# Patient Record
Sex: MALE | Race: WHITE | NOT HISPANIC OR LATINO | Employment: OTHER | URBAN - METROPOLITAN AREA
[De-identification: names, ages, dates, MRNs, and addresses within clinical notes are randomized per-mention and may not be internally consistent; named-entity substitution may affect disease eponyms.]

---

## 2023-12-13 DIAGNOSIS — M25.552 LEFT HIP PAIN: Primary | ICD-10-CM

## 2023-12-15 ENCOUNTER — HOSPITAL ENCOUNTER (OUTPATIENT)
Dept: RADIOLOGY | Facility: HOSPITAL | Age: 73
Discharge: HOME | End: 2023-12-15
Attending: ORTHOPAEDIC SURGERY
Payer: MEDICARE

## 2023-12-15 ENCOUNTER — OFFICE VISIT (OUTPATIENT)
Dept: ORTHOPEDICS | Facility: CLINIC | Age: 73
End: 2023-12-15
Payer: MEDICARE

## 2023-12-15 VITALS — BODY MASS INDEX: 25.67 KG/M2 | HEIGHT: 74 IN | WEIGHT: 200 LBS

## 2023-12-15 DIAGNOSIS — M76.892 HAMSTRING TENDINITIS OF LEFT THIGH: ICD-10-CM

## 2023-12-15 DIAGNOSIS — M76.02 GLUTEAL TENDINITIS OF LEFT BUTTOCK: Primary | ICD-10-CM

## 2023-12-15 DIAGNOSIS — M25.552 LEFT HIP PAIN: ICD-10-CM

## 2023-12-15 PROCEDURE — 99999 PR PBB SHADOW E&M-EST. PATIENT-LVL III: ICD-10-PCS | Mod: PBBFAC,,, | Performed by: ORTHOPAEDIC SURGERY

## 2023-12-15 PROCEDURE — 99203 PR OFFICE/OUTPT VISIT, NEW, LEVL III, 30-44 MIN: ICD-10-PCS | Mod: S$PBB,,, | Performed by: ORTHOPAEDIC SURGERY

## 2023-12-15 PROCEDURE — 99999 PR PBB SHADOW E&M-EST. PATIENT-LVL III: CPT | Mod: PBBFAC,,, | Performed by: ORTHOPAEDIC SURGERY

## 2023-12-15 PROCEDURE — 99213 OFFICE O/P EST LOW 20 MIN: CPT | Mod: PBBFAC | Performed by: ORTHOPAEDIC SURGERY

## 2023-12-15 PROCEDURE — 73502 XR HIP WITH PELVIS WHEN PERFORMED, 2 OR 3 VIEWS LEFT: ICD-10-PCS | Mod: 26,LT,, | Performed by: RADIOLOGY

## 2023-12-15 PROCEDURE — 73502 X-RAY EXAM HIP UNI 2-3 VIEWS: CPT | Mod: TC,LT

## 2023-12-15 PROCEDURE — 73502 X-RAY EXAM HIP UNI 2-3 VIEWS: CPT | Mod: 26,LT,, | Performed by: RADIOLOGY

## 2023-12-15 PROCEDURE — 99203 OFFICE O/P NEW LOW 30 MIN: CPT | Mod: S$PBB,,, | Performed by: ORTHOPAEDIC SURGERY

## 2023-12-15 RX ORDER — AMLODIPINE BESYLATE 5 MG/1
5 TABLET ORAL
COMMUNITY
Start: 2023-07-10

## 2023-12-15 RX ORDER — ROSUVASTATIN CALCIUM 40 MG/1
TABLET, COATED ORAL
COMMUNITY
Start: 2023-06-26

## 2023-12-15 RX ORDER — LISINOPRIL 2.5 MG/1
5 TABLET ORAL
COMMUNITY
End: 2024-01-30

## 2023-12-15 RX ORDER — SITAGLIPTIN 100 MG/1
TABLET, FILM COATED ORAL
COMMUNITY
Start: 2023-06-26

## 2023-12-15 RX ORDER — METFORMIN HYDROCHLORIDE EXTENDED-RELEASE TABLETS 500 MG/1
500 TABLET, FILM COATED, EXTENDED RELEASE ORAL
COMMUNITY

## 2023-12-15 RX ORDER — MELOXICAM 15 MG/1
TABLET ORAL
COMMUNITY

## 2023-12-21 NOTE — PROGRESS NOTES
"Subjective:       Patient ID: Daniel Adame is a 73 y.o. male.    Chief Complaint:   No chief complaint on file.  He comes in for initial consultation and advice regarding pain in the left buttock area.  He calls it glute pain.  He is generally very healthy and active for his age of 73.  Rides his bicycle a lot with no problems, but when he works with a  and walk several miles he gets pain in the buttock and lateral hip.  Has tight hamstrings.  He does have pain where he sits.  No groin pain.  No knee pain.  No distal numbness or tingling.    No past medical history on file.  No past surgical history on file.  No family history on file.  Social History     Socioeconomic History    Marital status:        Current Outpatient Medications   Medication Sig Dispense Refill    amLODIPine (NORVASC) 5 MG tablet Take 5 mg by mouth.      JANUVIA 100 mg Tab       rosuvastatin (CRESTOR) 40 MG Tab       lisinopriL (PRINIVIL,ZESTRIL) 2.5 MG tablet Take 5 mg by mouth.      meloxicam (MOBIC) 15 MG tablet       metFORMIN (FORTAMET) 500 mg 24hr tablet Take 500 mg by mouth.       No current facility-administered medications for this visit.     Review of patient's allergies indicates:   Allergen Reactions    Shellfish containing products Anaphylaxis    Shellfish derived Anaphylaxis    Iodinated contrast media Hives    Iodine          Objective:      Vitals:    12/15/23 1427   Weight: 90.7 kg (200 lb)   Height: 6' 2" (1.88 m)     Physical Exam  Negative Stinchfield, negative C sign.  Tender at the ischial tuberosity and greater trochanter.  Buttock pain with hamstring stretching.  Negative flip test.  Knee benign without tenderness or effusion with normal range of motion.  Distal neurovascular intact.  Imaging Review:   X-rays show no significant arthrosis, acute findings or suspicious bone defects  Assessment:   Tendinosis at the left hamstrings origin and at the greater trochanter  Plan:       He is referred to " physical therapy, and Dr. Ranjit Horan for further evaluation, interventions as deemed appropriate.  No arthroplasty indication.  The patient's pathophysiology was explained in detail with reference to x-rays, models, other visual aids as appropriate.  Treatment options were discussed in detail.  Questions were invited and answered to the patient's satisfaction.    Víctor De La Cruz MD  Orthopaedic Surgery

## 2024-01-03 NOTE — PROGRESS NOTES
CC: left hip pain    73 y.o. Male presents today for evaluation of his left hip pain. He was referred by Dr. De La Cruz for consideration of non-surgical treatment options. This pain is located proximally in the left buttock near the ischial tuberosity and over the lateral hip. He points with a few fingers over the posterior glute musculature. He is not interested in injection options at this time.   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 No pertinent past medical history.    PAST SURGICAL HISTORY:   History reviewed. No pertinent surgical history.    FAMILY HISTORY:   History reviewed. No pertinent family history.    SOCIAL HISTORY:   Social History     Socioeconomic History    Marital status:      Social Determinants of Health     Financial Resource Strain: Low Risk  (2024)    Overall Financial Resource Strain (CARDIA)     Difficulty of Paying Living Expenses: Not hard at all   Food Insecurity: No Food Insecurity (2024)    Hunger Vital Sign     Worried About Running Out of Food in the Last Year: Never true     Ran Out of Food in the Last Year: Never true   Transportation Needs: No Transportation Needs (2024)    PRAPARE - Transportation     Lack of Transportation (Medical): No     Lack of Transportation (Non-Medical): No   Physical Activity: Sufficiently Active (2024)    Exercise Vital Sign     Days of Exercise per Week: 4 days     Minutes of  "Exercise per Session: 60 min   Stress: No Stress Concern Present (1/2/2024)    [employer] - Occupational Stress Questionnaire     Feeling of Stress : Only a little   Social Connections: Unknown (1/2/2024)    Social Connection and Isolation Panel [NHANES]     Frequency of Communication with Friends and Family: Three times a week     Frequency of Social Gatherings with Friends and Family: More than three times a week     Active Member of Clubs or Organizations: Yes     Attends Club or Organization Meetings: More than 4 times per year     Marital Status:    Housing Stability: High Risk (1/2/2024)    Housing Stability Vital Sign     Unable to Pay for Housing in the Last Year: Yes     Unstable Housing in the Last Year: No       MEDICATIONS:     Current Outpatient Medications:     amLODIPine (NORVASC) 5 MG tablet, Take 5 mg by mouth., Disp: , Rfl:     JANUVIA 100 mg Tab, , Disp: , Rfl:     lisinopriL (PRINIVIL,ZESTRIL) 2.5 MG tablet, Take 5 mg by mouth., Disp: , Rfl:     meloxicam (MOBIC) 15 MG tablet, , Disp: , Rfl:     metFORMIN (FORTAMET) 500 mg 24hr tablet, Take 500 mg by mouth., Disp: , Rfl:     rosuvastatin (CRESTOR) 40 MG Tab, , Disp: , Rfl:     ALLERGIES:   Review of patient's allergies indicates:   Allergen Reactions    Shellfish containing products Anaphylaxis    Shellfish derived Anaphylaxis    Iodinated contrast media Hives    Iodine         PHYSICAL EXAMINATION:  BP (!) 149/76   Pulse 70   Ht 6' 2" (1.88 m)   Wt 93.7 kg (206 lb 9.1 oz)   BMI 26.52 kg/m²   Vitals signs and nursing note have been reviewed.  General: In no acute distress, well developed, well nourished, no diaphoresis  Eyes: EOM full and smooth, no eye redness or discharge  HENT: normocephalic and atraumatic, neck supple, trachea midline, no nasal discharge, no external ear redness or discharge  Cardiovascular: no LE edema  Lungs: respirations non-labored, no conversational dyspnea   Abd: non-distended, " no rigidity  MSK: no amputation or deformity, no swelling of extremities  Neuro: AAOx3, CN2-12 grossly intact  Skin: No rashes, warm and dry  Psychiatric: cooperative, pleasant, mood and affect appropriate for age    HIP: LEFT  The affected hip is compared to the contralateral hip.    Observation:    There is no edema, erythema, or ecchymosis in the lumbosacral region.   No obvious pelvic obliquity while standing.    No thoracolumbar scoliosis observed.    No midline skin abnormalities.  No atrophy noted in the lower limbs.    ROM:   Passive hip flexion to 120° on left and 120° on right.    Passive hip internal rotation to 45° on left and 45° on right.    Passive hip external rotation to 45° on left and 45° on right.    Passive hip abduction to 45° on left and 45° on right.    All motions above are without pain.    Tenderness To Palpation:  No tenderness at the ASIS, AIIS, PSIS, PIIS, iliac crest, pubic bones  No tenderness throughout the lumbar spine, iliolumbar region  + tenderness over the posterior glute adjacent to the SIJ and lower near ischial tuberosity  No tenderness over the greater trochanteric bursa or greater/lesser trochanters.  No tenderness at the glut attachments on the greater trochanter  No tenderness over proximal IT band or hip flexor musculature.    Strength Testing: (*pain)  Hip flexion - 5/5 on left and 5/5 on right  Hip extension - 5/5 on left and 5/5 on right  Hip adduction - 5/5 on left and 5/5 on right  Hip abduction - 5/5 on left and 5/5 on right  Knee flexion - 5/5 on left and 5/5 on right  Knee extension - 5/5 on left* and 5/5 on right    Special Tests:  Seated straight leg raise - negative  Supine straight leg raise - negative  Hamstring flexibility symmetric  Quadriceps flexibility symmetric    Log roll - negative  LIZA - negative  FADIR - negative  Scour test - negative  No pain with posterior hip capsule compression    Thigh thrust test - negative     Kush test negative.    Ronak  test - negative      Posture:  Upright and Anterior pelvic tilt with increased lumbar lordosis  Gait: Non-antalgic     TART (Tissue texture abnormality, Asymmetry,  Restriction of motion and/or Tenderness) changes:    Head:      Cervical Spine  Thoracic Spine  Lumbar Spine   C1  T1  L1 TTAR   C2  T2  L2 Neutral   C3  T3  L3 Neutral   C4  T4  L4 FRSR   C5  T5  L5 FRSR   C6  T6 Neutral     C7  T7 Neutral       T8 Neutral       T9 Neutral       T10 TTAR       T11 TTAR       T12 TTAR       Ribs:    Upper Extremity:    Abdomen:    Pelvis:  Innominate:Left anterior rotation  Pubic bone:Left interior pubic shear      Sacrum:Right on Left sacral torsion    Lower Extremity:  External tibial torsion on the left      Key   F= Flexed   E = Extended   R = Rotated   N = Neutral   S = Sidebent   TTA = tissue texture abnormality   L/R/B (last letter) = left/right/bilateral   HTP = fascial herniated trigger point   TB = fascial trigger band   CD = fascial continuum distortion       Neurovascular Exam:  Normal gait without Trendelenburg.  1+/4 reflexes at L4 and S1 dermatomes but symmetric  2+ femoral, DP, and PT pulses BL.  No skin changes, no abnormal hair distribution.  Sensation intact to light touch throughout the obturator and medial/lateral/posterior femoral cutaneous nerves.  Capillary refill intact to <2 seconds in all lower limb digits.    IMAGIN. X-ray obtained 12/15/2023 due to left hip pain   2. X-ray images were reviewed personally by me and then directly with patient.  3. FINDINGS: X-ray images obtained demonstrate no fracture or dislocation.  There is my mild-to-moderate narrowing of the bilateral hip cartilage spaces.  Advanced degenerative changes are seen in the lower lumbar spine. No suspicious appearing lytic or blastic lesions.  Vascular calcifications are present.   4. IMPRESSION: As above.       ASSESSMENT:      ICD-10-CM ICD-9-CM   1. Posterior pain of left hip  M25.552 719.45   2. Gluteal tendinitis of  left buttock  M76.02 726.5   3. Hamstring tendinitis of left thigh  M76.892 727.09   4. Somatic dysfunction of lumbar region  M99.03 739.3   5. Somatic dysfunction of pelvis region  M99.05 739.5   6. Somatic dysfunction of sacral region  M99.04 739.4   7. Somatic dysfunction of thoracic region  M99.02 739.2   8. Somatic dysfunction of lower extremity  M99.06 739.6       PLAN:  1-3. 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 This pain began without trauma and has been mildly progressive over the past few months. Daniel is interested in non-injection treatment options at this time as he is concerned about possible blood glucose fluctuations with steroid medication.    - XRs obtained 12/15/2023 and images were personally reviewed with the patient. See above for further detail.    - Symptoms, exam, and imaging are most consistent with left hip myofascial pain secondary to biomechanical restriction patterns through the lumbosacral spine and pelvis.  We discussed the importance of decreasing inflammation and strengthening and stabilizing to help promote and maintain symptom improvement/resolution.  This is commonly accomplished with a short course of an anti-inflammatory and icing in addition to osteopathic manipulation, a home exercise program or physical therapy.    - Based on his description/body language of pain and somatic dysfunction identified on exam, I discussed osteopathic manipulation as a treatment option today.  He consents to evaluation and treatment.  See below.    - HEP for the hips prescribed today. Handouts provided, explained, and exercises were demonstrated as needed. Encouraged to do daily. 17534 HOME EXERCISE PROGRAM (HEP):  The patient was taught a homegoing physical therapy regimen as described above by provider with assistance of sports medicine assistant. The patient demonstrated understanding of  the exercises and proper technique of their execution. This interaction took 15 minutes.       4-8.Somatic dysfunction of lumbar, pelvis, sacral, thoracic, lower extremity regions -     - OMT 5-6 regions. Oral consent obtained. Reviewed benefits and potential side effects. OMT indicated today due to signs and symptoms as well as local and remote somatic dysfunction findings and their related neurokinetic, lymphatic, fascial and/or arteriovenous body connections. OMT techniques used: Soft Tissue, Myofascial Release, Muscle Energy, and Fascial Distortion Model. Treatment was tolerated well. Improvement noted in segmental mobility post-treatment in dysfunctional regions. There were no adverse events and no complications immediately following treatment. Advised plenty of water to help alleviate soreness.       Future planning includes - additional OMT if helpful and if indicated. He would like to defer all injection options for treatment at this time.    All questions were answered to the best of my ability and all concerns were addressed at this time.    Follow up in 2-3 weeks for above, or sooner if needed.      This note is dictated using the M*Modal Fluency Direct word recognition program. There are word recognition mistakes that are occasionally missed on review.      Total time spent face-to face with patient counseling or coordinating care including prognosis, differential diagnosis, risks and benefits of treatment, instructions, compliance risk reductions as well as non-face-to-face time personally spent reviewing medial record, medical documentation, and coordination of care.     EST MINUTES X   [id number] 10-19    [id number] 20-29    [id number] 30-39 X   [id number] 40-54    NEW     91027 15-29    19554 30-44    [id number] 45-59    [id number] 60-74    PHONE      5-10    [id number] 11-20    [id number] 21-30

## 2024-01-09 ENCOUNTER — OFFICE VISIT (OUTPATIENT)
Dept: SPORTS MEDICINE | Facility: CLINIC | Age: 74
End: 2024-01-09
Payer: MEDICARE

## 2024-01-09 VITALS
HEIGHT: 74 IN | BODY MASS INDEX: 26.51 KG/M2 | DIASTOLIC BLOOD PRESSURE: 76 MMHG | HEART RATE: 70 BPM | WEIGHT: 206.56 LBS | SYSTOLIC BLOOD PRESSURE: 149 MMHG

## 2024-01-09 DIAGNOSIS — M99.05 SOMATIC DYSFUNCTION OF PELVIS REGION: ICD-10-CM

## 2024-01-09 DIAGNOSIS — M25.552 POSTERIOR PAIN OF LEFT HIP: Primary | ICD-10-CM

## 2024-01-09 DIAGNOSIS — M76.892 HAMSTRING TENDINITIS OF LEFT THIGH: ICD-10-CM

## 2024-01-09 DIAGNOSIS — M99.03 SOMATIC DYSFUNCTION OF LUMBAR REGION: ICD-10-CM

## 2024-01-09 DIAGNOSIS — M76.02 GLUTEAL TENDINITIS OF LEFT BUTTOCK: ICD-10-CM

## 2024-01-09 DIAGNOSIS — M99.04 SOMATIC DYSFUNCTION OF SACRAL REGION: ICD-10-CM

## 2024-01-09 DIAGNOSIS — M99.02 SOMATIC DYSFUNCTION OF THORACIC REGION: ICD-10-CM

## 2024-01-09 DIAGNOSIS — M99.06 SOMATIC DYSFUNCTION OF LOWER EXTREMITY: ICD-10-CM

## 2024-01-09 PROCEDURE — 97110 THERAPEUTIC EXERCISES: CPT | Mod: GP,,, | Performed by: ORTHOPAEDIC SURGERY

## 2024-01-09 PROCEDURE — 98927 OSTEOPATH MANJ 5-6 REGIONS: CPT | Mod: S$PBB,,, | Performed by: ORTHOPAEDIC SURGERY

## 2024-01-09 PROCEDURE — 98927 OSTEOPATH MANJ 5-6 REGIONS: CPT | Mod: PBBFAC,27 | Performed by: ORTHOPAEDIC SURGERY

## 2024-01-09 PROCEDURE — 99213 OFFICE O/P EST LOW 20 MIN: CPT | Mod: 25,PBBFAC | Performed by: ORTHOPAEDIC SURGERY

## 2024-01-09 PROCEDURE — 99204 OFFICE O/P NEW MOD 45 MIN: CPT | Mod: 25,S$PBB,, | Performed by: ORTHOPAEDIC SURGERY

## 2024-01-09 PROCEDURE — 99999 PR PBB SHADOW E&M-EST. PATIENT-LVL III: CPT | Mod: PBBFAC,,, | Performed by: ORTHOPAEDIC SURGERY

## 2024-01-30 ENCOUNTER — OFFICE VISIT (OUTPATIENT)
Dept: SPORTS MEDICINE | Facility: CLINIC | Age: 74
End: 2024-01-30
Payer: MEDICARE

## 2024-01-30 VITALS
BODY MASS INDEX: 26.31 KG/M2 | HEIGHT: 74 IN | WEIGHT: 205 LBS | HEART RATE: 67 BPM | DIASTOLIC BLOOD PRESSURE: 65 MMHG | SYSTOLIC BLOOD PRESSURE: 110 MMHG

## 2024-01-30 DIAGNOSIS — M99.04 SOMATIC DYSFUNCTION OF SACRAL REGION: ICD-10-CM

## 2024-01-30 DIAGNOSIS — M99.02 SOMATIC DYSFUNCTION OF THORACIC REGION: ICD-10-CM

## 2024-01-30 DIAGNOSIS — M99.05 SOMATIC DYSFUNCTION OF PELVIS REGION: ICD-10-CM

## 2024-01-30 DIAGNOSIS — M99.03 SOMATIC DYSFUNCTION OF LUMBAR REGION: ICD-10-CM

## 2024-01-30 DIAGNOSIS — M99.06 SOMATIC DYSFUNCTION OF LOWER EXTREMITY: ICD-10-CM

## 2024-01-30 DIAGNOSIS — M76.02 GLUTEAL TENDINITIS OF LEFT BUTTOCK: ICD-10-CM

## 2024-01-30 DIAGNOSIS — M25.552 POSTERIOR PAIN OF LEFT HIP: Primary | ICD-10-CM

## 2024-01-30 DIAGNOSIS — M76.892 HAMSTRING TENDINITIS OF LEFT THIGH: ICD-10-CM

## 2024-01-30 PROCEDURE — 98927 OSTEOPATH MANJ 5-6 REGIONS: CPT | Mod: S$PBB,,, | Performed by: ORTHOPAEDIC SURGERY

## 2024-01-30 PROCEDURE — 99214 OFFICE O/P EST MOD 30 MIN: CPT | Mod: 25,S$PBB,, | Performed by: ORTHOPAEDIC SURGERY

## 2024-01-30 PROCEDURE — 99999 PR PBB SHADOW E&M-EST. PATIENT-LVL III: CPT | Mod: PBBFAC,,, | Performed by: ORTHOPAEDIC SURGERY

## 2024-01-30 PROCEDURE — 98927 OSTEOPATH MANJ 5-6 REGIONS: CPT | Mod: PBBFAC,27 | Performed by: ORTHOPAEDIC SURGERY

## 2024-01-30 PROCEDURE — 99213 OFFICE O/P EST LOW 20 MIN: CPT | Mod: 25,PBBFAC | Performed by: ORTHOPAEDIC SURGERY

## 2024-01-30 RX ORDER — LISINOPRIL 5 MG/1
5 TABLET ORAL 2 TIMES DAILY
COMMUNITY
Start: 2023-07-28

## 2024-01-30 NOTE — PROGRESS NOTES
CC: left hip pain    Daniel is here today for follow up evaluation of his left hip pain. Patient reports his pain is 3/10 today. Today, he admits to continued tenderness over the left glute, ischial tuberosity. Pain is worsened by hip extension, knee flexion, direct pressure with sitting. He points to the midpoint of the left glute max with 2 fingers. He has decreased physical activity in the interim, still exercises with his . He has changed his massimo, stride and gait pattern when walking, and admits to some benefit when doing this attributable to OMT. Is unable to provide specific percentage improvement. He has not been doing HEP provided at last visit.    Recall from visit on 2024  73 y.o. Male presents today for evaluation of his left hip pain. He was referred by Dr. De La Cruz for consideration of non-surgical treatment options. This pain is located proximally in the left buttock near the ischial tuberosity and over the lateral hip. He points with a few fingers over the posterior glute musculature. He is not interested in injection options at this time.   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 No pertinent past medical history.    PAST SURGICAL HISTORY:   History reviewed. No pertinent surgical history.    FAMILY HISTORY:   History reviewed. No pertinent family history.    SOCIAL HISTORY:   Social History     Socioeconomic History    Marital  status:      Social Determinants of Health     Financial Resource Strain: Low Risk  (1/2/2024)    Overall Financial Resource Strain (CARDIA)     Difficulty of Paying Living Expenses: Not hard at all   Food Insecurity: No Food Insecurity (1/2/2024)    Hunger Vital Sign     Worried About Running Out of Food in the Last Year: Never true     Ran Out of Food in the Last Year: Never true   Transportation Needs: No Transportation Needs (1/2/2024)    PRAPARE - Transportation     Lack of Transportation (Medical): No     Lack of Transportation (Non-Medical): No   Physical Activity: Sufficiently Active (1/2/2024)    Exercise Vital Sign     Days of Exercise per Week: 4 days     Minutes of Exercise per Session: 60 min   Stress: No Stress Concern Present (1/2/2024)    [employer] - Occupational Stress Questionnaire     Feeling of Stress : Only a little   Social Connections: Unknown (1/2/2024)    Social Connection and Isolation Panel [NHANES]     Frequency of Communication with Friends and Family: Three times a week     Frequency of Social Gatherings with Friends and Family: More than three times a week     Active Member of Clubs or Organizations: Yes     Attends Club or Organization Meetings: More than 4 times per year     Marital Status:    Housing Stability: High Risk (1/2/2024)    Housing Stability Vital Sign     Unable to Pay for Housing in the Last Year: Yes     Unstable Housing in the Last Year: No       MEDICATIONS:     Current Outpatient Medications:     amLODIPine (NORVASC) 5 MG tablet, Take 5 mg by mouth., Disp: , Rfl:     JANUVIA 100 mg Tab, , Disp: , Rfl:     lisinopriL (PRINIVIL,ZESTRIL) 5 MG tablet, Take 5 mg by mouth 2 (two) times a day., Disp: , Rfl:     meloxicam (MOBIC) 15 MG tablet, , Disp: , Rfl:     metFORMIN (FORTAMET) 500 mg 24hr tablet, Take 500 mg by mouth., Disp: , Rfl:     rosuvastatin (CRESTOR) 40 MG Tab, , Disp: , Rfl:     ALLERGIES:   Review of patient's  "allergies indicates:   Allergen Reactions    Shellfish containing products Anaphylaxis    Shellfish derived Anaphylaxis    Iodinated contrast media Hives    Iodine         PHYSICAL EXAMINATION:  /65   Pulse 67   Ht 6' 2" (1.88 m)   Wt 93 kg (205 lb 0.4 oz)   BMI 26.32 kg/m²   Vitals signs and nursing note have been reviewed.  General: In no acute distress, well developed, well nourished, no diaphoresis  Eyes: EOM full and smooth, no eye redness or discharge  HENT: normocephalic and atraumatic, neck supple, trachea midline, no nasal discharge, no external ear redness or discharge  Cardiovascular: no LE edema  Lungs: respirations non-labored, no conversational dyspnea   Abd: non-distended, no rigidity  MSK: no amputation or deformity, no swelling of extremities  Neuro: AAOx3, CN2-12 grossly intact  Skin: No rashes, warm and dry  Psychiatric: cooperative, pleasant, mood and affect appropriate for age    HIP: LEFT  The affected hip is compared to the contralateral hip.    Observation:    There is no edema, erythema, or ecchymosis in the lumbosacral region.   No obvious pelvic obliquity while standing.    No thoracolumbar scoliosis observed.    No midline skin abnormalities.  No atrophy noted in the lower limbs.    ROM:   Passive hip flexion to 120° on left and 120° on right.    Passive hip internal rotation to 45° on left and 45° on right.    Passive hip external rotation to 45° on left and 45° on right.    Passive hip abduction to 45° on left and 45° on right.    All motions above are without pain.    Tenderness To Palpation:  No tenderness at the ASIS, AIIS, PSIS, PIIS, iliac crest, pubic bones  No tenderness throughout the lumbar spine, iliolumbar region  + tenderness over the left posterior glute musculature and more mild lower near ischial tuberosity, focal point of maximal tenderness over medial glute  No tenderness over the greater trochanteric bursa or greater/lesser trochanters.  No tenderness at the " glut attachments on the greater trochanter  No tenderness over proximal IT band or hip flexor musculature.    Strength Testing: (*pain)  Hip flexion - 5/5 on left and 5/5 on right  Hip extension - 5/5 on left* and 5/5 on right  Hip adduction - 5/5 on left and 5/5 on right  Hip abduction - 5/5 on left and 5/5 on right  Knee flexion - 5/5 on left and 5/5 on right  Knee extension - 5/5 on left and 5/5 on right    Special Tests:  Seated straight leg raise - negative  Supine straight leg raise - negative  Hamstring flexibility tighter on left  Quadriceps flexibility tighter on left    Log roll - negative  LIZA - negative  FADIR - negative  Scour test - negative  No pain with posterior hip capsule compression      Posture:  Upright and Anterior pelvic tilt with increased lumbar lordosis  Gait: Non-antalgic     TART (Tissue texture abnormality, Asymmetry,  Restriction of motion and/or Tenderness) changes:    Head:      Cervical Spine  Thoracic Spine  Lumbar Spine   C1  T1 Neutral L1 TTAB   C2  T2 Neutral L2 Neutral   C3  T3 Neutral L3 FRSL   C4  T4 Neutral L4 FRSL   C5  T5 TTAB L5 FRSL   C6  T6 TTAB     C7  T7 TTAB       T8 TTAB       T9 TTAB       T10 TTAB       T11 TTAB       T12 TTAB       Ribs:    Upper Extremity:    Abdomen:    Pelvis:  Innominate:Left anterior rotation  Pubic bone:Left interior pubic shear  Fascial herniated trigger point at the inferomedial aspect of the left PSIS    Sacrum:Right on Left sacral torsion  Fascial herniated trigger point at the lateral aspect of the left sacral border    Lower Extremity:  External tibial torsion on the left  Myofascial restriction left posterior thigh, left posterior hip      Key   F= Flexed   E = Extended   R = Rotated   N = Neutral   S = Sidebent   TTA = tissue texture abnormality   L/R/B (last letter) = left/right/bilateral   HTP = fascial herniated trigger point   TB = fascial trigger band   CD = fascial continuum distortion       Neurovascular Exam:  Normal gait  without Trendelenburg.  1+/4 reflexes at L4 and S1 dermatomes but symmetric  2+ femoral, DP, and PT pulses BL.  No skin changes, no abnormal hair distribution.  Sensation intact to light touch throughout the obturator and medial/lateral/posterior femoral cutaneous nerves.  Capillary refill intact to <2 seconds in all lower limb digits.    IMAGIN. X-ray obtained 12/15/2023 due to left hip pain   2. X-ray images were reviewed personally by me and then directly with patient.  3. FINDINGS: X-ray images obtained demonstrate no fracture or dislocation.  There is my mild-to-moderate narrowing of the bilateral hip cartilage spaces.  Advanced degenerative changes are seen in the lower lumbar spine. No suspicious appearing lytic or blastic lesions.  Vascular calcifications are present.   4. IMPRESSION: As above.       ASSESSMENT:      ICD-10-CM ICD-9-CM   1. Posterior pain of left hip  M25.552 719.45   2. Gluteal tendinitis of left buttock  M76.02 726.5   3. Hamstring tendinitis of left thigh  M76.892 727.09   4. Somatic dysfunction of lumbar region  M99.03 739.3   5. Somatic dysfunction of pelvis region  M99.05 739.5   6. Somatic dysfunction of sacral region  M99.04 739.4   7. Somatic dysfunction of thoracic region  M99.02 739.2   8. Somatic dysfunction of lower extremity  M99.06 739.6       PLAN:  1-3. 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 This pain began without trauma and has been mildly progressive over the past few months. Daniel is interested in non-injection treatment options at this time as he is concerned about possible blood glucose fluctuations with steroid medication.    - His pain is overall stable today, with some mild improvement with walking that he attributes to the OMT provided at prior visit as well as work with his . His pain is more localized to the left gluteal musculature  based on his exam findings.    - Symptoms are still consistent with myofascial restriction and focusing on improving his body mechanics and muscle recruitment will hopefully help with his remaining symptoms.    - Based on his description/body language of pain and somatic dysfunction identified on exam, I discussed osteopathic manipulation as a treatment option today.  He consents to evaluation and treatment.  See below.    - Continue with HEP for the hips prescribed at initial visit. We also discussed that he can discuss with his  to focus on gluteal stability exercises to augment HEP.      4-8. Somatic dysfunction of lumbar, pelvis, sacral, thoracic, lower extremity regions -     - OMT 5-6 regions. Oral consent obtained. Reviewed benefits and potential side effects. OMT indicated today due to signs and symptoms as well as local and remote somatic dysfunction findings and their related neurokinetic, lymphatic, fascial and/or arteriovenous body connections. OMT techniques used: Soft Tissue, Myofascial Release, Muscle Energy, and Fascial Distortion Model. Treatment was tolerated well. Improvement noted in segmental mobility post-treatment in dysfunctional regions. There were no adverse events and no complications immediately following treatment. Advised plenty of water to help alleviate soreness.       Future planning includes - additional OMT if helpful and if indicated. Consider additional imaging if OMT is not beneficial. He would like to continue to defer all injection options for treatment at this time.    All questions were answered to the best of my ability and all concerns were addressed at this time.    Follow up in 2-3 weeks for above, or sooner if needed.      This note is dictated using the M*Modal Fluency Direct word recognition program. There are word recognition mistakes that are occasionally missed on review.      Total time spent face-to face with patient counseling or coordinating  care including prognosis, differential diagnosis, risks and benefits of treatment, instructions, compliance risk reductions as well as non-face-to-face time personally spent reviewing medial record, medical documentation, and coordination of care.     EST MINUTES X   02560 10-19    30517 20-29    [id number] 30-39 X   [id number] 40-54    NEW     [id number] 15-29    [id number] 30-44    [id number] 45-59    [id number] 60-74    PHONE      5-10    31230 11-20    [id number] 21-30

## 2024-02-23 ENCOUNTER — OFFICE VISIT (OUTPATIENT)
Dept: SPORTS MEDICINE | Facility: CLINIC | Age: 74
End: 2024-02-23
Payer: MEDICARE

## 2024-02-23 VITALS
SYSTOLIC BLOOD PRESSURE: 125 MMHG | BODY MASS INDEX: 26.32 KG/M2 | WEIGHT: 205 LBS | DIASTOLIC BLOOD PRESSURE: 64 MMHG | HEART RATE: 66 BPM

## 2024-02-23 DIAGNOSIS — M99.06 SOMATIC DYSFUNCTION OF LOWER EXTREMITY: ICD-10-CM

## 2024-02-23 DIAGNOSIS — M76.02 GLUTEAL TENDINITIS OF LEFT BUTTOCK: ICD-10-CM

## 2024-02-23 DIAGNOSIS — M99.03 SOMATIC DYSFUNCTION OF LUMBAR REGION: ICD-10-CM

## 2024-02-23 DIAGNOSIS — M76.892 HAMSTRING TENDINITIS OF LEFT THIGH: ICD-10-CM

## 2024-02-23 DIAGNOSIS — M99.02 SOMATIC DYSFUNCTION OF THORACIC REGION: ICD-10-CM

## 2024-02-23 DIAGNOSIS — M99.04 SOMATIC DYSFUNCTION OF SACRAL REGION: ICD-10-CM

## 2024-02-23 DIAGNOSIS — M25.552 POSTERIOR PAIN OF LEFT HIP: Primary | ICD-10-CM

## 2024-02-23 DIAGNOSIS — M21.70 LEG LENGTH DISCREPANCY: ICD-10-CM

## 2024-02-23 DIAGNOSIS — M99.05 SOMATIC DYSFUNCTION OF PELVIS REGION: ICD-10-CM

## 2024-02-23 PROCEDURE — 99213 OFFICE O/P EST LOW 20 MIN: CPT | Mod: PBBFAC,25 | Performed by: ORTHOPAEDIC SURGERY

## 2024-02-23 PROCEDURE — 99214 OFFICE O/P EST MOD 30 MIN: CPT | Mod: 25,S$PBB,, | Performed by: ORTHOPAEDIC SURGERY

## 2024-02-23 PROCEDURE — 99999 PR PBB SHADOW E&M-EST. PATIENT-LVL III: CPT | Mod: PBBFAC,,, | Performed by: ORTHOPAEDIC SURGERY

## 2024-02-23 PROCEDURE — 98927 OSTEOPATH MANJ 5-6 REGIONS: CPT | Mod: S$PBB,,, | Performed by: ORTHOPAEDIC SURGERY

## 2024-02-23 PROCEDURE — 98927 OSTEOPATH MANJ 5-6 REGIONS: CPT | Mod: PBBFAC | Performed by: ORTHOPAEDIC SURGERY

## 2024-03-22 ENCOUNTER — OFFICE VISIT (OUTPATIENT)
Dept: SPORTS MEDICINE | Facility: CLINIC | Age: 74
End: 2024-03-22
Payer: MEDICARE

## 2024-03-22 VITALS
SYSTOLIC BLOOD PRESSURE: 121 MMHG | DIASTOLIC BLOOD PRESSURE: 68 MMHG | BODY MASS INDEX: 26.04 KG/M2 | HEART RATE: 66 BPM | WEIGHT: 202.81 LBS

## 2024-03-22 DIAGNOSIS — M76.892 HAMSTRING TENDINITIS OF LEFT THIGH: ICD-10-CM

## 2024-03-22 DIAGNOSIS — M99.05 SOMATIC DYSFUNCTION OF PELVIS REGION: ICD-10-CM

## 2024-03-22 DIAGNOSIS — M99.03 SOMATIC DYSFUNCTION OF LUMBAR REGION: ICD-10-CM

## 2024-03-22 DIAGNOSIS — M99.04 SOMATIC DYSFUNCTION OF SACRAL REGION: ICD-10-CM

## 2024-03-22 DIAGNOSIS — M99.06 SOMATIC DYSFUNCTION OF LOWER EXTREMITY: ICD-10-CM

## 2024-03-22 DIAGNOSIS — M25.552 POSTERIOR PAIN OF LEFT HIP: Primary | ICD-10-CM

## 2024-03-22 DIAGNOSIS — M99.02 SOMATIC DYSFUNCTION OF THORACIC REGION: ICD-10-CM

## 2024-03-22 DIAGNOSIS — M76.02 GLUTEAL TENDINITIS OF LEFT BUTTOCK: ICD-10-CM

## 2024-03-22 DIAGNOSIS — M21.70 LEG LENGTH DISCREPANCY: ICD-10-CM

## 2024-03-22 PROCEDURE — 99999 PR PBB SHADOW E&M-EST. PATIENT-LVL III: CPT | Mod: PBBFAC,,, | Performed by: ORTHOPAEDIC SURGERY

## 2024-03-22 PROCEDURE — 99213 OFFICE O/P EST LOW 20 MIN: CPT | Mod: PBBFAC | Performed by: ORTHOPAEDIC SURGERY

## 2024-03-22 PROCEDURE — 98927 OSTEOPATH MANJ 5-6 REGIONS: CPT | Mod: PBBFAC,27 | Performed by: ORTHOPAEDIC SURGERY

## 2024-03-22 PROCEDURE — 99214 OFFICE O/P EST MOD 30 MIN: CPT | Mod: 25,S$PBB,, | Performed by: ORTHOPAEDIC SURGERY

## 2024-03-22 PROCEDURE — 98927 OSTEOPATH MANJ 5-6 REGIONS: CPT | Mod: S$PBB,,, | Performed by: ORTHOPAEDIC SURGERY

## 2024-03-22 NOTE — PROGRESS NOTES
CC: left hip pain    Daniel is here today for follow up evaluation of his left hip pain. Patient reports his pain is 2/10 today. He states his pain has remained stable since his last visit. He has been wearing his 3 mm shoe lift since his last visit and denies new falls or trauma. He continues to work with his  once a week.  He also has been concerned about his gait.    Recall from visit on 2024  Daniel is here today for follow up evaluation of his left hip pain. Patient reports his pain is 2/10 today. He admits to appreciating mild improvement in his symptoms with OMT at his last visit. He continues to appreciating pain that is increased with sitting, but has been able to improve his pain by modifying his gait. He denies new falls or trauma since his last visit.     Recall from visit on 2024  Daniel is here today for follow up evaluation of his left hip pain. Patient reports his pain is 3/10 today. Today, he admits to continued tenderness over the left glute, ischial tuberosity. Pain is worsened by hip extension, knee flexion, direct pressure with sitting. He points to the midpoint of the left glute max with 2 fingers. He has decreased physical activity in the interim, still exercises with his . He has changed his massimo, stride and gait pattern when walking, and admits to some benefit when doing this attributable to OMT. Is unable to provide specific percentage improvement. He has not been doing HEP provided at last visit.    Recall from visit on 2024  73 y.o. 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 He was referred by Dr. De La Cruz for consideration of non-surgical treatment options. This pain is located proximally in the left buttock near the ischial tuberosity and over the lateral hip. He points with a few fingers over the posterior glute musculature. He is not interested in injection options at this time.   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 No pertinent past medical history.    PAST SURGICAL HISTORY:   History reviewed. No pertinent surgical history.    FAMILY HISTORY:   History reviewed. No pertinent family history.    SOCIAL HISTORY:   Social History     Socioeconomic History    Marital status:    Tobacco Use    Smoking status: Never    Smokeless tobacco: Never     Social Determinants of Health     Financial Resource Strain: Low Risk  (1/2/2024)    Overall Financial Resource Strain (CARDIA)     Difficulty of Paying Living Expenses: Not hard at all   Food Insecurity: No Food Insecurity (1/2/2024)    Hunger Vital Sign     Worried About Running Out of Food in the Last Year: Never true     Ran Out of Food in the Last Year: Never true   Transportation Needs: No Transportation Needs (1/2/2024)    PRAPARE - Transportation     Lack of Transportation (Medical): No     Lack of Transportation (Non-Medical): No   Physical Activity: Sufficiently Active (1/2/2024)    Exercise Vital Sign     Days of Exercise per Week: 4 days     Minutes of Exercise per Session: 60 min   Stress: No Stress Concern Present (1/2/2024)    [employer] - Occupational Stress Questionnaire     Feeling of Stress : Only a little   Social Connections: Unknown (1/2/2024)    Social Connection and Isolation Panel [NHANES]     Frequency of Communication with Friends and Family: Three times a  week     Frequency of Social Gatherings with Friends and Family: More than three times a week     Active Member of Clubs or Organizations: Yes     Attends Club or Organization Meetings: More than 4 times per year     Marital Status:    Housing Stability: High Risk (1/2/2024)    Housing Stability Vital Sign     Unable to Pay for Housing in the Last Year: Yes     Unstable Housing in the Last Year: No       MEDICATIONS:     Current Outpatient Medications:     amLODIPine (NORVASC) 5 MG tablet, Take 5 mg by mouth., Disp: , Rfl:     JANUVIA 100 mg Tab, , Disp: , Rfl:     lisinopriL (PRINIVIL,ZESTRIL) 5 MG tablet, Take 5 mg by mouth 2 (two) times a day., Disp: , Rfl:     meloxicam (MOBIC) 15 MG tablet, , Disp: , Rfl:     metFORMIN (FORTAMET) 500 mg 24hr tablet, Take 500 mg by mouth., Disp: , Rfl:     rosuvastatin (CRESTOR) 40 MG Tab, , Disp: , Rfl:     ALLERGIES:   Review of patient's allergies indicates:   Allergen Reactions    Shellfish containing products Anaphylaxis    Shellfish derived Anaphylaxis    Iodinated contrast media Hives    Iodine         PHYSICAL EXAMINATION:  /68   Pulse 66   Wt 92 kg (202 lb 13.2 oz)   BMI 26.04 kg/m²   Vitals signs and nursing note have been reviewed.  General: In no acute distress, well developed, well nourished, no diaphoresis  Eyes: EOM full and smooth, no eye redness or discharge  HENT: normocephalic and atraumatic, neck supple, trachea midline, no nasal discharge, no external ear redness or discharge  Cardiovascular: no LE edema  Lungs: respirations non-labored, no conversational dyspnea   Abd: non-distended, no rigidity  MSK: no amputation or deformity, no swelling of extremities  Neuro: AAOx3, CN2-12 grossly intact  Skin: No rashes, warm and dry  Psychiatric: cooperative, pleasant, mood and affect appropriate for age    HIP: LEFT  The affected hip is compared to the contralateral hip.    Observation:    There is no edema, erythema, or ecchymosis in the lumbosacral  region.   No obvious pelvic obliquity while standing.    No thoracolumbar scoliosis observed.    No midline skin abnormalities.  No atrophy noted in the lower limbs.    ROM:   Passive hip flexion to 120° on left and 120° on right.    Passive hip internal rotation to 45° on left and 45° on right.    Passive hip external rotation to 45° on left and 45° on right.    Passive hip abduction to 45° on left and 45° on right.    All motions above are without pain.    Tenderness To Palpation:  No tenderness at the ASIS, AIIS, PSIS, PIIS, iliac crest, pubic bones  No tenderness throughout the lumbar spine, iliolumbar region  + tenderness over the left sacral base, SI joint  + tenderness over the left posterior glute musculature   No tenderness over the greater trochanteric bursa or greater/lesser trochanters.  No tenderness at the glut attachments on the greater trochanter  No tenderness over proximal IT band or hip flexor musculature.    Strength Testing: (*pain)  Hip flexion - 5/5 on left and 5/5 on right  Hip extension - 5/5 on left* and 5/5 on right  Hip adduction - 5/5 on left and 5/5 on right  Hip abduction - 5/5 on left and 5/5 on right  Knee flexion - 5/5 on left and 5/5 on right  Knee extension - 5/5 on left and 5/5 on right    Special Tests:  Seated straight leg raise - negative  Supine straight leg raise - negative  Hamstring flexibility tighter on left  Quadriceps flexibility tighter on left    Log roll - negative  LIZA - negative  FADIR - negative  Scour test - negative  No pain with posterior hip capsule compression      Posture:  Upright and Anterior pelvic tilt with increased lumbar lordosis  Gait: Non-antalgic     TART (Tissue texture abnormality, Asymmetry,  Restriction of motion and/or Tenderness) changes:    Head:      Cervical Spine  Thoracic Spine  Lumbar Spine   C1  T1 Neutral L1 TTAB   C2  T2 Neutral L2 TTAB   C3  T3 Neutral L3 TTAB   C4  T4 Neutral L4 FRSL   C5  T5 TTAB L5 FRSL   C6  T6 TTAB     C7   T7 TTAB       T8 TTAB       T9 TTAB       T10 TTAB       T11 TTAB       T12 TTAB       Ribs:    Upper Extremity:    Abdomen:    Pelvis:  Innominate:Left anterior rotation  Pubic bone:Left interior pubic shear    Sacrum:Right on Left sacral torsion  Fascial herniated trigger point at the left sacral base, lateral aspect     Lower Extremity:  Short right leg, partially corrected with OMT  External tibial torsion on the left      Key   F= Flexed   E = Extended   R = Rotated   N = Neutral   S = Sidebent   TTA = tissue texture abnormality   L/R/B (last letter) = left/right/bilateral   HTP = fascial herniated trigger point   TB = fascial trigger band   CD = fascial continuum distortion       Neurovascular Exam:  Normal gait without Trendelenburg.  2+ femoral, DP, and PT pulses BL.  No skin changes, no abnormal hair distribution.  Sensation intact to light touch throughout the obturator and medial/lateral/posterior femoral cutaneous nerves.  Capillary refill intact to <2 seconds in all lower limb digits.    IMAGIN. X-ray obtained 12/15/2023 due to left hip pain   2. X-ray images were reviewed personally by me and then directly with patient.  3. FINDINGS: X-ray images obtained demonstrate no fracture or dislocation.  There is my mild-to-moderate narrowing of the bilateral hip cartilage spaces.  Advanced degenerative changes are seen in the lower lumbar spine. No suspicious appearing lytic or blastic lesions.  Vascular calcifications are present.   4. IMPRESSION: As above.       ASSESSMENT:      ICD-10-CM ICD-9-CM   1. Posterior pain of left hip  M25.552 719.45   2. Gluteal tendinitis of left buttock  M76.02 726.5   3. Hamstring tendinitis of left thigh  M76.892 727.09   4. Leg length discrepancy  M21.70 736.81   5. Somatic dysfunction of lumbar region  M99.03 739.3   6. Somatic dysfunction of pelvis region  M99.05 739.5   7. Somatic dysfunction of lower extremity  M99.06 739.6   8. Somatic dysfunction of sacral region   M99.04 739.4   9. Somatic dysfunction of thoracic region  M99.02 739.2         PLAN:  1-3. Posterior left hip pain/gluteal tendinitis/hamstring tendinitis - stable  4. 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 This pain began without trauma and has been mildly progressive over the past few months. He is interested in non-injection treatment options at this time as he is concerned about possible blood glucose fluctuations with steroid medication.    - His pain is overall stable today, with some mild improvement with walking that he attributes to the OMT provided at prior visit as well as work with his . His pain is more localized to the left gluteal musculature today. He is working more on his gait with his  which he thinks is helping.    - Based on his description/body language of pain and somatic dysfunction identified on exam, I discussed osteopathic manipulation as a treatment option today.  He consents to evaluation and treatment.  See below.    - Continue with HEP for the hips prescribed at initial visit. We also discussed that he can discuss with his  to focus on gluteal stability exercises to augment HEP.    - Continue with 3 mm shoe lift on the right.      5-9. Somatic dysfunction of lumbar, pelvis, sacral, thoracic, lower extremity regions -     - OMT 5-6 regions. Oral consent obtained. Reviewed benefits and potential side effects. OMT indicated today due to signs and symptoms as well as local and remote somatic dysfunction findings and their related neurokinetic, lymphatic, fascial and/or arteriovenous body connections. OMT techniques used: Soft Tissue, Myofascial Release, Muscle Energy, and Fascial Distortion Model. Treatment was tolerated well. Improvement noted in segmental mobility post-treatment in dysfunctional regions. There were no adverse events and no complications immediately following treatment. Advised plenty of water to  help alleviate soreness.       Future planning includes - additional OMT if helpful and if indicated. Consider additional imaging if OMT is not beneficial. Shoe lift adjustments    All questions were answered to the best of my ability and all concerns were addressed at this time.    Follow up in 4 weeks for above, or sooner if needed.       This note is dictated using the M*Modal Fluency Direct word recognition program. There are word recognition mistakes that are occasionally missed on review.      Total time spent face-to face with patient counseling or coordinating care including prognosis, differential diagnosis, risks and benefits of treatment, instructions, compliance risk reductions as well as non-face-to-face time personally spent reviewing medial record, medical documentation, and coordination of care.     EST MINUTES X   [id number] 10-19    [id number] 20-29    [id number] 30-39 X   [id number] 40-54    NEW     [id number] 15-29    [id number] 30-44    [id number] 45-59    [id number] 60-74    PHONE      5-10    [id number] 11-20    [id number] 21-30

## 2024-05-07 ENCOUNTER — OFFICE VISIT (OUTPATIENT)
Dept: SPORTS MEDICINE | Facility: CLINIC | Age: 74
End: 2024-05-07
Payer: MEDICARE

## 2024-05-07 VITALS
DIASTOLIC BLOOD PRESSURE: 61 MMHG | WEIGHT: 202.81 LBS | HEIGHT: 74 IN | SYSTOLIC BLOOD PRESSURE: 119 MMHG | BODY MASS INDEX: 26.03 KG/M2

## 2024-05-07 DIAGNOSIS — M21.70 LEG LENGTH DISCREPANCY: ICD-10-CM

## 2024-05-07 DIAGNOSIS — M76.02 GLUTEAL TENDINITIS OF LEFT BUTTOCK: ICD-10-CM

## 2024-05-07 DIAGNOSIS — M25.552 POSTERIOR PAIN OF LEFT HIP: Primary | ICD-10-CM

## 2024-05-07 DIAGNOSIS — M76.892 HAMSTRING TENDINITIS OF LEFT THIGH: ICD-10-CM

## 2024-05-07 PROCEDURE — 99213 OFFICE O/P EST LOW 20 MIN: CPT | Mod: PBBFAC | Performed by: ORTHOPAEDIC SURGERY

## 2024-05-07 PROCEDURE — 99999 PR PBB SHADOW E&M-EST. PATIENT-LVL III: CPT | Mod: PBBFAC,,, | Performed by: ORTHOPAEDIC SURGERY

## 2024-05-07 PROCEDURE — 99214 OFFICE O/P EST MOD 30 MIN: CPT | Mod: S$PBB,,, | Performed by: ORTHOPAEDIC SURGERY

## 2024-05-07 NOTE — PROGRESS NOTES
CC: left hip pain    Daniel is here today for follow up evaluation of his left hip pain. Patient reports his pain is 3/10 today. He states that his pain is overall unchanged since his last visit. He continues to wear 3mm shoe lift since his last visit. He denies any new falls or trauma. He feels as though OMT has been the right treatment option, but he does not appreciate any significant long-term change from it.  He continues to work with his  at least twice a week which he feels is helpful as well.    Recall from visit on 3/22/24  Daniel is here today for follow up evaluation of his left hip pain. Patient reports his pain is 2/10 today. He states his pain has remained stable since his last visit. He has been wearing his 3 mm shoe lift since his last visit and denies new falls or trauma. He continues to work with his  once a week.  He also has been concerned about his gait.    Recall from visit on 02/23/2024  Daniel is here today for follow up evaluation of his left hip pain. Patient reports his pain is 2/10 today. He admits to appreciating mild improvement in his symptoms with OMT at his last visit. He continues to appreciating pain that is increased with sitting, but has been able to improve his pain by modifying his gait. He denies new falls or trauma since his last visit.     Recall from visit on 01/30/2024  Daniel is here today for follow up evaluation of his left hip pain. Patient reports his pain is 3/10 today. Today, he admits to continued tenderness over the left glute, ischial tuberosity. Pain is worsened by hip extension, knee flexion, direct pressure with sitting. He points to the midpoint of the left glute max with 2 fingers. He has decreased physical activity in the interim, still exercises with his . He has changed his massimo, stride and gait pattern when walking, and admits to some benefit when doing this attributable to OMT. Is unable to provide specific  percentage improvement. He has not been doing HEP provided at last visit.    Recall from visit on 2024  74 y.o. 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 He was referred by Dr. De La Cruz for consideration of non-surgical treatment options. This pain is located proximally in the left buttock near the ischial tuberosity and over the lateral hip. He points with a few fingers over the posterior glute musculature. He is not interested in injection options at this time.   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 No pertinent past medical history.    PAST SURGICAL HISTORY:   History reviewed. No pertinent surgical history.    FAMILY HISTORY:   No family history on file.    SOCIAL HISTORY:   Social History     Socioeconomic History    Marital status:    Tobacco Use    Smoking status: Never    Smokeless tobacco: Never     Social Determinants of Health     Financial Resource Strain: Low Risk  (2024)    Overall Financial Resource Strain (CARDIA)     Difficulty of Paying Living Expenses: Not hard at all   Food Insecurity: No Food Insecurity (2024)    Hunger Vital Sign     Worried About Running Out of Food in the Last Year: Never true     Ran Out of Food in the Last Year: Never true   Transportation Needs: No Transportation Needs (2024)    PRAPARE - Transportation     Lack of Transportation (Medical): No     Lack of Transportation  "(Non-Medical): No   Physical Activity: Sufficiently Active (1/2/2024)    Exercise Vital Sign     Days of Exercise per Week: 4 days     Minutes of Exercise per Session: 60 min   Stress: No Stress Concern Present (1/2/2024)    [employer] - Occupational Stress Questionnaire     Feeling of Stress : Only a little   Housing Stability: High Risk (1/2/2024)    Housing Stability Vital Sign     Unable to Pay for Housing in the Last Year: Yes     Unstable Housing in the Last Year: No       MEDICATIONS:     Current Outpatient Medications:     amLODIPine (NORVASC) 5 MG tablet, Take 5 mg by mouth., Disp: , Rfl:     JANUVIA 100 mg Tab, , Disp: , Rfl:     lisinopriL (PRINIVIL,ZESTRIL) 5 MG tablet, Take 5 mg by mouth 2 (two) times a day., Disp: , Rfl:     meloxicam (MOBIC) 15 MG tablet, , Disp: , Rfl:     metFORMIN (FORTAMET) 500 mg 24hr tablet, Take 500 mg by mouth., Disp: , Rfl:     rosuvastatin (CRESTOR) 40 MG Tab, , Disp: , Rfl:     ALLERGIES:   Review of patient's allergies indicates:   Allergen Reactions    Shellfish containing products Anaphylaxis    Shellfish derived Anaphylaxis    Iodinated contrast media Hives    Iodine         PHYSICAL EXAMINATION:  /61   Ht 6' 2" (1.88 m)   Wt 92 kg (202 lb 13.2 oz)   BMI 26.04 kg/m²   Vitals signs and nursing note have been reviewed.  General: In no acute distress, well developed, well nourished, no diaphoresis  Eyes: EOM full and smooth, no eye redness or discharge  HENT: normocephalic and atraumatic, neck supple, trachea midline, no nasal discharge, no external ear redness or discharge  Cardiovascular: no LE edema  Lungs: respirations non-labored, no conversational dyspnea   Abd: non-distended, no rigidity  MSK: no amputation or deformity, no swelling of extremities  Neuro: AAOx3, CN2-12 grossly intact  Skin: No rashes, warm and dry  Psychiatric: cooperative, pleasant, mood and affect appropriate for age    HIP: LEFT  The affected hip is compared " to the contralateral hip.    Observation:    There is no edema, erythema, or ecchymosis in the lumbosacral region.   No obvious pelvic obliquity while standing.    No thoracolumbar scoliosis observed.    No midline skin abnormalities.  No atrophy noted in the lower limbs.    ROM:   Passive hip flexion to 120° on left and 120° on right.    Passive hip internal rotation to 45° on left and 45° on right.    Passive hip external rotation to 45° on left and 45° on right.    Passive hip abduction to 45° on left and 45° on right.    All motions above are without pain.    Tenderness To Palpation:  No tenderness at the ASIS, AIIS, PSIS, PIIS, iliac crest, pubic bones  No tenderness throughout the lumbar spine, iliolumbar region  + tenderness over the left sacral base, SI joint  + tenderness over the left posterior glute musculature   No tenderness over the greater trochanteric bursa or greater/lesser trochanters.  No tenderness at the glut attachments on the greater trochanter  No tenderness over proximal IT band or hip flexor musculature.    Strength Testing: (*pain)  Hip flexion - 5/5 on left and 5/5 on right  Hip extension - 5/5 on left* and 5/5 on right  Hip adduction - 5/5 on left and 5/5 on right  Hip abduction - 5/5 on left and 5/5 on right  Knee flexion - 5/5 on left and 5/5 on right  Knee extension - 5/5 on left and 5/5 on right    Special Tests:  Seated straight leg raise - negative  Supine straight leg raise - negative  Hamstring flexibility tighter on left  Quadriceps flexibility tighter on left    Log roll - negative  LIZA - negative  FADIR - negative  Scour test - negative  No pain with posterior hip capsule compression      Posture:  Upright and Anterior pelvic tilt with increased lumbar lordosis  Gait: Non-antalgic     Neurovascular Exam:  Normal gait without Trendelenburg.  2+ femoral, DP, and PT pulses BL.  No skin changes, no abnormal hair distribution.  Sensation intact to light touch throughout the  obturator and medial/lateral/posterior femoral cutaneous nerves.  Capillary refill intact to <2 seconds in all lower limb digits.    IMAGIN. X-ray obtained 12/15/2023 due to left hip pain   2. X-ray images were reviewed personally by me and then directly with patient.  3. FINDINGS: X-ray images obtained demonstrate no fracture or dislocation.  There is my mild-to-moderate narrowing of the bilateral hip cartilage spaces.  Advanced degenerative changes are seen in the lower lumbar spine. No suspicious appearing lytic or blastic lesions.  Vascular calcifications are present.   4. IMPRESSION: As above.       ASSESSMENT:      ICD-10-CM ICD-9-CM   1. Posterior pain of left hip  M25.552 719.45   2. Gluteal tendinitis of left buttock  M76.02 726.5   3. Hamstring tendinitis of left thigh  M76.892 727.09   4. Leg length discrepancy  M21.70 736.81           PLAN:  1-3. Posterior left hip pain/gluteal tendinitis/hamstring tendinitis - stable  4. 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 This pain began without trauma and has been mildly progressive over the past few months. He is interested in non-injection treatment options at this time as he is concerned about possible blood glucose fluctuations with steroid medication.    - His pain is overall stable today, with some mild improvement with walking that he attributes to the OMT provided at prior visit as well as work with his . His pain is more localized to the left gluteal musculature today. He is working more on his gait with his  which he thinks is helping.    - Continue with HEP for the hips prescribed at initial visit. We also discussed that he can discuss with his  to focus on gluteal stability exercises to augment HEP.    - Continue with 3 mm shoe lift on the right.    - He is traveling to [place] for the summer and we will see if this extended period of time away from [place] changes  symptoms at all.  If his pain worsens, it may be that the OMT was helping more than we thought.  If his symptoms do not change, then continue with personal training and only OMT if indicated.      Future planning includes - additional OMT if helpful and if indicated. Consider additional imaging if OMT is not beneficial. Shoe lift adjustments    All questions were answered to the best of my ability and all concerns were addressed at this time.    Follow up in 4 weeks for above, or sooner if needed.       This note is dictated using the M*Modal Fluency Direct word recognition program. There are word recognition mistakes that are occasionally missed on review.      Total time spent face-to face with patient counseling or coordinating care including prognosis, differential diagnosis, risks and benefits of treatment, instructions, compliance risk reductions as well as non-face-to-face time personally spent reviewing medial record, medical documentation, and coordination of care.     EST MINUTES X   [id number] 10-19    51582 20-29    [id number] 30-39 X   [id number] 40-54    NEW     [id number] 15-29    35148 30-44    [id number] 45-59    [id number] 60-74    PHONE      5-10    [id number] 11-20    [id number] 21-30

## 2025-08-15 ENCOUNTER — TELEPHONE (OUTPATIENT)
Dept: AUDIOLOGY | Facility: CLINIC | Age: 75
End: 2025-08-15
Payer: MEDICARE